# Patient Record
Sex: FEMALE | Race: WHITE | NOT HISPANIC OR LATINO | Employment: OTHER | URBAN - NONMETROPOLITAN AREA
[De-identification: names, ages, dates, MRNs, and addresses within clinical notes are randomized per-mention and may not be internally consistent; named-entity substitution may affect disease eponyms.]

---

## 2024-04-05 ENCOUNTER — OFFICE VISIT (OUTPATIENT)
Dept: URGENT CARE | Facility: CLINIC | Age: 69
End: 2024-04-05
Payer: MEDICARE

## 2024-04-05 VITALS
WEIGHT: 146 LBS | RESPIRATION RATE: 20 BRPM | DIASTOLIC BLOOD PRESSURE: 78 MMHG | SYSTOLIC BLOOD PRESSURE: 118 MMHG | OXYGEN SATURATION: 99 % | BODY MASS INDEX: 24.92 KG/M2 | HEIGHT: 64 IN | HEART RATE: 75 BPM | TEMPERATURE: 95.7 F

## 2024-04-05 DIAGNOSIS — K57.92 DIVERTICULITIS: Primary | ICD-10-CM

## 2024-04-05 PROBLEM — K21.9 GASTROESOPHAGEAL REFLUX DISEASE WITHOUT ESOPHAGITIS: Status: ACTIVE | Noted: 2019-11-19

## 2024-04-05 PROCEDURE — 99203 OFFICE O/P NEW LOW 30 MIN: CPT | Performed by: PHYSICIAN ASSISTANT

## 2024-04-05 PROCEDURE — G0463 HOSPITAL OUTPT CLINIC VISIT: HCPCS | Performed by: PHYSICIAN ASSISTANT

## 2024-04-05 RX ORDER — FAMOTIDINE 20 MG/1
20 TABLET, FILM COATED ORAL
COMMUNITY

## 2024-04-05 RX ORDER — ATORVASTATIN CALCIUM 10 MG/1
10 TABLET, FILM COATED ORAL DAILY
COMMUNITY
Start: 2024-04-01

## 2024-04-05 RX ORDER — AMOXICILLIN AND CLAVULANATE POTASSIUM 875; 125 MG/1; MG/1
1 TABLET, FILM COATED ORAL EVERY 12 HOURS SCHEDULED
Qty: 14 TABLET | Refills: 0 | Status: SHIPPED | OUTPATIENT
Start: 2024-04-05 | End: 2024-04-12

## 2024-04-05 NOTE — PROGRESS NOTES
[facility]        NAME: Tiffany Antony is a 68 y.o. female  : 1955    MRN: [id number]  DATE: 2024  TIME: 9:43 AM    Assessment and Plan   Diverticulitis [K57.92]  1. Diverticulitis  amoxicillin-clavulanate (AUGMENTIN) 875-125 mg per tablet            Patient Instructions     Patient Instructions   Diverticulitis   AMBULATORY CARE:   Diverticulitis  is a condition that causes small pockets along your intestine called diverticula to become inflamed or infected. This is caused by hard bowel movement, food, or bacteria that get stuck in the pockets.       Signs and symptoms include the following:   Pain in the lower left side of your abdomen    Fever and chills    Nausea or vomiting    Constipation or diarrhea    An urge to urinate or have a bowel movement more often than usual    Bloody bowel movements    Bloating and gas    Seek care immediately if:   You have bowel movement or foul-smelling discharge leaking from your vagina or in your urine.    You have severe diarrhea.    You urinate less than usual or not at all.    You are not able to have a bowel movement.    You cannot stop vomiting.    You have cramps or severe abdominal pain and a fever.    You have new or increased blood in your bowel movements.    Call your doctor if:   You have pain when you urinate.    Your symptoms get worse or do not go away.    You have questions or concerns about your condition or care.    Treatment:  Mild diverticulitis can be treated at home. You may need to rest and follow a clear liquid diet until your diverticulitis gets better. You will be admitted to the hospital if you have severe diverticulitis. You may need any of the following:  Antibiotics  help treat or prevent a bacterial infection.    Prescription pain medicine  may be given. Ask your healthcare provider how to take this medicine safely. Some prescription pain medicines contain acetaminophen. Do not take other medicines that contain  acetaminophen without talking to your healthcare provider. Too much acetaminophen may cause liver damage. Prescription pain medicine may cause constipation. Ask your healthcare provider how to prevent or treat constipation.     An IV  may be used to give you liquids and nutrition. You may not be able to eat or drink anything until your healthcare provider says it is okay.    Drainage  may be done to reduce inflammation or treat infection. Your healthcare provider may insert a small tube through an incision in your abdomen to drain pus from infected diverticula.    Surgery  may be needed if there is a hole in your bowel or a large amount of swelling. A healthcare provider will remove the infected or inflamed areas of your colon.    Clear liquid diet:  A clear liquid diet includes any liquids that you can see through. Examples include water, ginger-erasto, cranberry or apple juice, frozen fruit ice, or broth. Stay on a clear liquid diet until your symptoms are gone, or as directed.  Follow up with your doctor as directed:  You may need to return for a colonoscopy. When your symptoms are gone, you may need a low-fat, high-fiber diet to prevent diverticulitis from developing again. Your healthcare provider or dietitian can help you create meal plans. Write down your questions so you remember to ask them during your visits.  © Copyright [employer] 2023 Information is for End User's use only and may not be sold, redistributed or otherwise used for commercial purposes.  The above information is an  only. It is not intended as medical advice for individual conditions or treatments. Talk to your doctor, nurse or pharmacist before following any medical regimen to see if it is safe and effective for you.        Follow up with PCP in 3-5 days.  Proceed to  ER if symptoms worsen.    Chief Complaint     Chief Complaint   Patient presents with    Fever     The patient c/o body aches, diarrhea, chills, nausea since Monday          History of Present Illness       The patient c/o body aches, diarrhea, chills, nausea since Monday. She is not taking medication. She Had colonoscopy last year and had a few polyps.  She will have another in 5 years.  She does have a history of diverticulosis        Review of Systems   Review of Systems   Constitutional:  Positive for appetite change, chills and fever. Negative for fatigue and unexpected weight change.   HENT:  Negative for congestion, dental problem, ear discharge, ear pain, hearing loss, postnasal drip, rhinorrhea, sinus pressure, sinus pain, sneezing, sore throat and trouble swallowing.    Eyes:  Negative for discharge, redness, itching and visual disturbance.   Respiratory:  Negative for cough, chest tightness and stridor.    Cardiovascular:  Negative for chest pain and palpitations.   Gastrointestinal:  Positive for abdominal pain and diarrhea. Negative for nausea and vomiting.   Genitourinary:  Negative for dysuria.   Neurological:  Positive for weakness. Negative for dizziness, light-headedness and headaches.         Current Medications       Current Outpatient Medications:     amoxicillin-clavulanate (AUGMENTIN) 875-125 mg per tablet, Take 1 tablet by mouth every 12 (twelve) hours for 7 days, Disp: 14 tablet, Rfl: 0    atorvastatin (LIPITOR) 10 mg tablet, Take 10 mg by mouth daily, Disp: , Rfl:     denosumab (PROLIA) 60 mg/mL, Inject 60 mg under the skin, Disp: , Rfl:     famotidine (PEPCID) 20 mg tablet, Take 20 mg by mouth, Disp: , Rfl:     Current Allergies     Allergies as of 04/05/2024    (Not on File)            The following portions of the patient's history were reviewed and updated as appropriate: allergies, current medications, past family history, past medical history, past social history, past surgical history and problem list.     No past medical history on file.    No past surgical history on file.    No family history on file.      Medications have been  "verified.        Objective   /78   Pulse 75   Temp (!) 95.7 °F (35.4 °C)   Resp 20   Ht 5' 4\" (1.626 m)   Wt 66.2 kg (146 lb)   SpO2 99%   BMI 25.06 kg/m²        Physical Exam     Physical Exam  Constitutional:       Appearance: She is well-developed. She is not diaphoretic.   HENT:      Head: Normocephalic.      Right Ear: Tympanic membrane normal.      Left Ear: Tympanic membrane normal.      Nose: No congestion or rhinorrhea.   Eyes:      General:         Right eye: No discharge.         Left eye: No discharge.      Pupils: Pupils are equal, round, and reactive to light.   Neck:      Thyroid: No thyromegaly.   Cardiovascular:      Rate and Rhythm: Normal rate.      Heart sounds: No murmur heard.  Pulmonary:      Effort: Pulmonary effort is normal. No respiratory distress.      Breath sounds: No wheezing or rales.   Chest:      Chest wall: No tenderness.   Abdominal:      General: There is no distension.      Palpations: Abdomen is soft.      Tenderness: There is no abdominal tenderness. There is no right CVA tenderness, left CVA tenderness, guarding or rebound.          Comments: -There is mild tenderness to palpation noted in the left lower quadrant.  There is no guarding or rebound.  There no signs of acute abdomen.   Musculoskeletal:         General: Normal range of motion.      Cervical back: Normal range of motion.   Lymphadenopathy:      Cervical: No cervical adenopathy.   Skin:     General: Skin is warm.   Neurological:      Mental Status: She is alert and oriented to person, place, and time.             -symptoms most consistent with diverticulitis.  I will treat with Augmentin.  She will follow-up with PCP or go to the ER if symptoms worsen      "

## 2024-04-05 NOTE — PATIENT INSTRUCTIONS
Diverticulitis   AMBULATORY CARE:   Diverticulitis  is a condition that causes small pockets along your intestine called diverticula to become inflamed or infected. This is caused by hard bowel movement, food, or bacteria that get stuck in the pockets.       Signs and symptoms include the following:   Pain in the lower left side of your abdomen    Fever and chills    Nausea or vomiting    Constipation or diarrhea    An urge to urinate or have a bowel movement more often than usual    Bloody bowel movements    Bloating and gas    Seek care immediately if:   You have bowel movement or foul-smelling discharge leaking from your vagina or in your urine.    You have severe diarrhea.    You urinate less than usual or not at all.    You are not able to have a bowel movement.    You cannot stop vomiting.    You have cramps or severe abdominal pain and a fever.    You have new or increased blood in your bowel movements.    Call your doctor if:   You have pain when you urinate.    Your symptoms get worse or do not go away.    You have questions or concerns about your condition or care.    Treatment:  Mild diverticulitis can be treated at home. You may need to rest and follow a clear liquid diet until your diverticulitis gets better. You will be admitted to the hospital if you have severe diverticulitis. You may need any of the following:  Antibiotics  help treat or prevent a bacterial infection.    Prescription pain medicine  may be given. Ask your healthcare provider how to take this medicine safely. Some prescription pain medicines contain acetaminophen. Do not take other medicines that contain acetaminophen without talking to your healthcare provider. Too much acetaminophen may cause liver damage. Prescription pain medicine may cause constipation. Ask your healthcare provider how to prevent or treat constipation.     An IV  may be used to give you liquids and nutrition. You may not be able to eat or drink anything until  your healthcare provider says it is okay.    Drainage  may be done to reduce inflammation or treat infection. Your healthcare provider may insert a small tube through an incision in your abdomen to drain pus from infected diverticula.    Surgery  may be needed if there is a hole in your bowel or a large amount of swelling. A healthcare provider will remove the infected or inflamed areas of your colon.    Clear liquid diet:  A clear liquid diet includes any liquids that you can see through. Examples include water, ginger-erasto, cranberry or apple juice, frozen fruit ice, or broth. Stay on a clear liquid diet until your symptoms are gone, or as directed.  Follow up with your doctor as directed:  You may need to return for a colonoscopy. When your symptoms are gone, you may need a low-fat, high-fiber diet to prevent diverticulitis from developing again. Your healthcare provider or dietitian can help you create meal plans. Write down your questions so you remember to ask them during your visits.  © Copyright [employer] 2023 Information is for End User's use only and may not be sold, redistributed or otherwise used for commercial purposes.  The above information is an  only. It is not intended as medical advice for individual conditions or treatments. Talk to your doctor, nurse or pharmacist before following any medical regimen to see if it is safe and effective for you.